# Patient Record
Sex: MALE | Race: WHITE | Employment: UNEMPLOYED | URBAN - METROPOLITAN AREA
[De-identification: names, ages, dates, MRNs, and addresses within clinical notes are randomized per-mention and may not be internally consistent; named-entity substitution may affect disease eponyms.]

---

## 2018-04-16 ENCOUNTER — APPOINTMENT (OUTPATIENT)
Dept: CT IMAGING | Facility: HOSPITAL | Age: 73
End: 2018-04-16
Attending: EMERGENCY MEDICINE
Payer: OTHER MISCELLANEOUS

## 2018-04-16 ENCOUNTER — HOSPITAL ENCOUNTER (EMERGENCY)
Facility: HOSPITAL | Age: 73
Discharge: HOME | End: 2018-04-16
Attending: EMERGENCY MEDICINE
Payer: OTHER MISCELLANEOUS

## 2018-04-16 ENCOUNTER — APPOINTMENT (OUTPATIENT)
Dept: GENERAL RADIOLOGY | Facility: HOSPITAL | Age: 73
End: 2018-04-16
Attending: EMERGENCY MEDICINE
Payer: OTHER MISCELLANEOUS

## 2018-04-16 ENCOUNTER — APPOINTMENT (OUTPATIENT)
Dept: OTHER | Facility: HOSPITAL | Age: 73
End: 2018-04-16
Attending: EMERGENCY MEDICINE

## 2018-04-16 VITALS
SYSTOLIC BLOOD PRESSURE: 131 MMHG | RESPIRATION RATE: 16 BRPM | WEIGHT: 214 LBS | OXYGEN SATURATION: 94 % | HEIGHT: 69 IN | HEART RATE: 62 BPM | TEMPERATURE: 98 F | DIASTOLIC BLOOD PRESSURE: 62 MMHG | BODY MASS INDEX: 31.7 KG/M2

## 2018-04-16 DIAGNOSIS — S06.0X0A CONCUSSION WITHOUT LOSS OF CONSCIOUSNESS, INITIAL ENCOUNTER: Primary | ICD-10-CM

## 2018-04-16 DIAGNOSIS — M25.511 ACUTE PAIN OF RIGHT SHOULDER: ICD-10-CM

## 2018-04-16 DIAGNOSIS — M54.50 BACK PAIN, LUMBOSACRAL: ICD-10-CM

## 2018-04-16 PROCEDURE — 72110 X-RAY EXAM L-2 SPINE 4/>VWS: CPT | Performed by: EMERGENCY MEDICINE

## 2018-04-16 PROCEDURE — 99284 EMERGENCY DEPT VISIT MOD MDM: CPT

## 2018-04-16 PROCEDURE — 99285 EMERGENCY DEPT VISIT HI MDM: CPT

## 2018-04-16 PROCEDURE — 73030 X-RAY EXAM OF SHOULDER: CPT | Performed by: EMERGENCY MEDICINE

## 2018-04-16 PROCEDURE — 70450 CT HEAD/BRAIN W/O DYE: CPT | Performed by: EMERGENCY MEDICINE

## 2018-04-16 PROCEDURE — 71101 X-RAY EXAM UNILAT RIBS/CHEST: CPT | Performed by: EMERGENCY MEDICINE

## 2018-04-16 RX ORDER — ACETAMINOPHEN 500 MG
1000 TABLET ORAL ONCE
Status: COMPLETED | OUTPATIENT
Start: 2018-04-16 | End: 2018-04-16

## 2018-04-16 NOTE — ED NOTES
Rufina Small Director of  called to check up on patient. No information was given to Rufina Small.  Rufina Small [phone]

## 2018-04-16 NOTE — ED INITIAL ASSESSMENT (HPI)
Pt aox4. Pt presents to ed per EMS from work. Pt slipped on ice. Pt hit rt posterior side of head, rt shoulder, rt ribs today at work. Pt doesn't remember the the fall.

## 2018-04-16 NOTE — ED NOTES
[facility] health RN called back & states Boris Nash 139 request breath alcohol/urine screen. [facility]

## 2018-04-16 NOTE — ED NOTES
Pt was given a sandwich and apple juice.  Spoke to Gio King. Pt to be transferred to [facility] after d/c

## 2018-04-16 NOTE — ED PROVIDER NOTES
Patient Seen in: [facility] Emergency Department    History   Patient presents with:  Trauma (cardiovascular, musculoskeletal)    Stated Complaint: Fall/     HPI    69-year-old white male who presents emergency room today for complaint of fall.   Natali Herrera rhythm without murmur gallop or rub. Patient has reproducible right-sided chest wall tenderness and right subscapular tenderness. No crepitance is noted. No thoracic spine pain is noted.     Abdomen is soft nondistended nontender to deep palpation there reveals:  FINDINGS:    LUNGS:  No significant pulmonary parenchymal abnormalities and normal vascularity. CARDIAC:  Normal size cardiac silhouette. MEDIASTINUM:  Normal.  PLEURA:  Normal.  BONES:  Normal for age.  No rib fracture or lesion.   SOFT TISSUES initial encounter  (primary encounter diagnosis)  Acute pain of right shoulder  Back pain, lumbosacral    Disposition:  Discharge  4/16/2018 12:35 pm    Follow-up:  IMELDA Haynes [address] [id number] [phone]    In 2 d

## 2018-04-17 ENCOUNTER — OFFICE VISIT (OUTPATIENT)
Dept: OTHER | Facility: HOSPITAL | Age: 73
End: 2018-04-17
Attending: PREVENTIVE MEDICINE

## 2018-04-17 ENCOUNTER — HOSPITAL ENCOUNTER (EMERGENCY)
Dept: CT IMAGING | Facility: HOSPITAL | Age: 73
Discharge: HOME | End: 2018-04-17
Attending: PREVENTIVE MEDICINE
Payer: OTHER MISCELLANEOUS

## 2018-04-17 DIAGNOSIS — S00.93XA HEAD CONTUSION: ICD-10-CM

## 2018-04-17 DIAGNOSIS — S00.93XA HEAD CONTUSION: Primary | ICD-10-CM

## 2018-04-17 PROCEDURE — 70450 CT HEAD/BRAIN W/O DYE: CPT | Performed by: PREVENTIVE MEDICINE

## 2018-04-17 NOTE — ED INITIAL ASSESSMENT (HPI)
Pt return to ed from home through work comp, pt c/o a fall 1 day ago, pt work up in the ed, return today with c/o increase head pain and unsteady gate. pt awake,alert and talking.

## 2018-04-17 NOTE — ED PROVIDER NOTES
Patient Seen in: [facility] Emergency Department    History   Patient presents with:  Headache (neurologic)    Stated Complaint: CHI    HPI    This is a 58-year-old male with past medical history of hypertension, hyperlipidemia and a baby aspirin da 97.9 °F (36.6 °C)  Temp src: n/a  SpO2: 99 %  O2 Device: n/a    Current:/79   Pulse 66   Temp 97.9 °F (36.6 °C)   Resp 18   Wt 97.1 kg   SpO2 93%   BMI 31.61 kg/m²         Physical Exam    GENERAL: Awake, alert oriented x3, nontoxic appearing.    SKIN 2233  ------------------------------------------------------------   Ct Brain Or Head (15361)    Result Date: 4/17/2018  PROCEDURE:  CT BRAIN OR HEAD (83322)  COMPARISON:  ARTIE CT BRAIN OR HEAD (16626), 4/16/2018, 10:56.   INDICATIONS:  Head trauma, hea the plan of care. He was discharged home in good condition with his wife.           Disposition and Plan     Clinical Impression:  Post concussion syndrome  (primary encounter diagnosis)  Muscle strain    Disposition:  Discharge  4/17/2018  5:53 pm    Foll

## 2018-04-20 ENCOUNTER — TELEPHONE (OUTPATIENT)
Dept: NEUROLOGY | Facility: CLINIC | Age: 73
End: 2018-04-20

## 2018-04-20 ENCOUNTER — APPOINTMENT (OUTPATIENT)
Dept: OTHER | Facility: HOSPITAL | Age: 73
End: 2018-04-20
Attending: PREVENTIVE MEDICINE

## 2018-04-26 ENCOUNTER — TELEPHONE (OUTPATIENT)
Dept: SURGERY | Facility: CLINIC | Age: 73
End: 2018-04-26

## 2018-04-26 NOTE — TELEPHONE ENCOUNTER
Advised that patient is on cancellation list for evaluation, and that for any neurological changes, or intolerable headaches, patient should present to ER for evaluation.

## 2018-05-01 ENCOUNTER — TELEPHONE (OUTPATIENT)
Dept: NEUROLOGY | Facility: CLINIC | Age: 73
End: 2018-05-01

## 2018-05-01 NOTE — PATIENT INSTRUCTIONS
Follow up in ~ 2 months   Have imaging done  Have labs done   Set up appointment with PT / OT  Start taking amitriptyline for prevention 10 mg nightly x1 week then increase to 20 mg nightly     Refill policies:    • Allow 2-3 business days for refills; con days. It is highly recommended patients contact their insurance carrier directly to determine coverage. If test is done without insurance authorization, patient may be responsible for the entire amount billed.       Precertification and Prior Authorization

## 2018-05-01 NOTE — H&P
[facility] Patient / Consult Visit    Chucky Vance is a 67year old male. Referring MD: No ref.  provider found    Patient presents with:  Head Injury: C/O of daily headaches,dizziness,gait issues,short t steps before losing balance. In terms of sleep, he has not been doing well - states he has poor sleep and has worsening pain on R side of head even when lying lightly on a pillow.               Otherwise, patient denies any recent weight change, fevers 69\".    Weight as of 4/17/18: 214 lb 1.1 oz.     GENERAL: well developed, well nourished, in no apparent distress  SKIN: no rashes  EYES: sclera anicteric, conjunctiva normal  HEENT: normocephalic  CARDIOVASCULAR: S1, S2 normal, RRR  LUNGS: clear to auscul pain  Tone: normal    Sensory:  Pin is normal  Vibration is reduced in left LE - absent L LE at great toes - present ~ 8 sec on L at malleolus; intact on R   Proprioception is normal  Romberg is unable to test - falls over standing with eyes open and feet head, and endorses pain in the right arm, with intermittent numbness in the same region, as well as his exam findings, I have recommended obtaining MRI of the cervical spine to evaluate for possible stenosis/myelopathy, contributing to his continued gait i [facility],        OP REFERRAL TO [facility],         CANCELED: MRI SPINE CERVICAL (CPT=72141)        As noted above     (G44.52) New persistent daily headache  Plan: as noted above     (R26.81) Gait instability  Plan: OP REFERRAL TO

## 2018-05-08 ENCOUNTER — HOSPITAL ENCOUNTER (OUTPATIENT)
Dept: GENERAL RADIOLOGY | Age: 73
Discharge: HOME | End: 2018-05-08
Attending: Other
Payer: OTHER MISCELLANEOUS

## 2018-05-08 ENCOUNTER — TELEPHONE (OUTPATIENT)
Dept: NEUROLOGY | Facility: CLINIC | Age: 73
End: 2018-05-08

## 2018-05-08 ENCOUNTER — APPOINTMENT (OUTPATIENT)
Dept: LAB | Age: 73
End: 2018-05-08
Attending: Other
Payer: OTHER MISCELLANEOUS

## 2018-05-08 DIAGNOSIS — R26.81 GAIT INSTABILITY: ICD-10-CM

## 2018-05-08 PROCEDURE — 36415 COLL VENOUS BLD VENIPUNCTURE: CPT

## 2018-05-08 PROCEDURE — 82607 VITAMIN B-12: CPT

## 2018-05-08 PROCEDURE — 72050 X-RAY EXAM NECK SPINE 4/5VWS: CPT | Performed by: OTHER

## 2018-05-08 NOTE — TELEPHONE ENCOUNTER
Called and spoke with  concerning imaging order. Asking if patient needed to have an extension/flexion views included. Informed  that exam has already ended. Would not be able to add views to existing order.     Thankful for t

## 2018-05-11 ENCOUNTER — TELEPHONE (OUTPATIENT)
Dept: NEUROLOGY | Facility: CLINIC | Age: 73
End: 2018-05-11

## 2018-05-11 NOTE — TELEPHONE ENCOUNTER
PT initial evaluation rec'd; patient to initiate PT at 3 x week for 2 weeks then possibly decrease to 2 x week for 2 weeks pending progression of symptoms. Form endorsed to Dr. Kyra Moreno for signature.

## 2018-05-15 ENCOUNTER — TELEPHONE (OUTPATIENT)
Dept: NEUROLOGY | Facility: CLINIC | Age: 73
End: 2018-05-15

## 2018-05-17 ENCOUNTER — TELEPHONE (OUTPATIENT)
Dept: NEUROLOGY | Facility: CLINIC | Age: 73
End: 2018-05-17

## 2018-05-18 ENCOUNTER — TELEPHONE (OUTPATIENT)
Dept: NEUROLOGY | Facility: CLINIC | Age: 73
End: 2018-05-18

## 2018-05-18 NOTE — TELEPHONE ENCOUNTER
----- Message from Berkley Horowitz MD sent at 5/18/2018  9:31 AM CDT -----  Results noted, all labs normal; will discuss at next visit

## 2018-05-23 ENCOUNTER — TELEPHONE (OUTPATIENT)
Dept: NEUROLOGY | Facility: CLINIC | Age: 73
End: 2018-05-23

## 2018-05-23 NOTE — TELEPHONE ENCOUNTER
Nisa Petty PT calling to report that he feels patient could benefit from a neuro-ophth consult and possible prisms for vision support. Eyes were noted to be dysfucntional and headaches elicited with simple tracking movements.  He has difficulty tracking and r

## 2018-06-04 NOTE — PROGRESS NOTES
[facility] Progress Note    HPI  Patient presents with:  Neurologic Problem:  Follow up and not doing good and having headaches mostly on the right side       As per my initial H&P from 5/1/18:  \" Amisha Cain is a 67year old, who has poor sleep and has worsening pain on R side of head even when lying lightly on a pillow.               Otherwise, patient denies any recent weight change, fevers, chills, nausea, double vision/ blurry vision / loss of vision, chest pain, palpitations, s nightly., Disp: , Rfl:   •  Amitriptyline HCl 10 MG Oral Tab, Take 3 tablets (30 mg total) by mouth nightly., Disp: 90 tablet, Rfl: 1  •  AmLODIPine Besylate 5 MG Oral Tab, Take 5 mg by mouth daily. , Disp: , Rfl:   •  atorvastatin 20 MG Oral Tab, Take 20 m reflect osteopenia or osteoporosis. There is mild apex left cervical curvature. There is straightening of the expected lordosis. There is a few mm anterolisthesis of C2 on C3 and C4 on C5, which may be secondary to   degenerative laxity.   Vertebral body off work at this time, pending repeat evaluation in approximately 1 month     For his continued headaches, will increase amitriptyline up to 30 mg nightly.      (F07.81) Post concussive syndrome  (primary encounter diagnosis)  Plan: OPTOMETRY([facility])-EXTERNAL,

## 2018-06-04 NOTE — PATIENT INSTRUCTIONS
See vestibular therapy  Set up appointment with optemetry for evaluation of eye movement issues  Increase amitriptyline to 30 mg nightly - take Tylenol or Ibuprofen as needed   Follow up in 6 weeks   Refill policies:    • Allow 2-3 business days for refill 3-10 days. It is highly recommended patients contact their insurance carrier directly to determine coverage. If test is done without insurance authorization, patient may be responsible for the entire amount billed.       Precertification and Prior [employer]

## 2018-06-07 ENCOUNTER — TELEPHONE (OUTPATIENT)
Dept: NEUROLOGY | Facility: CLINIC | Age: 73
End: 2018-06-07

## 2018-06-07 NOTE — TELEPHONE ENCOUNTER
Josh Lundberg, rep from [employer], Kaylen Garcia w/c provider, is requesting pt's last OV notes.   Fax to [phone]

## 2018-06-28 ENCOUNTER — TELEPHONE (OUTPATIENT)
Dept: NEUROLOGY | Facility: CLINIC | Age: 73
End: 2018-06-28

## 2018-06-28 NOTE — TELEPHONE ENCOUNTER
Received paperwork for patient, who has upcoming appointment 7/2/18. However China Del Rio would like to relay some concerns he has for treatment plan, requesting call by Dr. Prentis Siemens.

## 2018-06-29 NOTE — TELEPHONE ENCOUNTER
Called and discussed case with Geoffrey Hall from PT - patient not having benefit from PT and vestibular therapy and plan to do vision therapy with Dr. Chapa Husbands (optometry) first for ~ 1 month and then consider resuming PT/vestibular therapy    Thought be complicati headaches, suppression as the brain is forced to \"turn off\" or ignore the visual input from one eye to keep from seeing double, and eventual double vision is visual system breakdown. Traumatic brain injuries can certainly exacerbate these issues.     I f

## 2018-07-02 NOTE — PATIENT INSTRUCTIONS
Refill policies:    • Allow 2-3 business days for refills; controlled substances may take longer.   • Contact your pharmacy at least 5 days prior to running out of medication and have them send an electronic request or submit request through the “request re entire amount billed. Precertification and Prior Authorizations: If your physician has recommended that you have a procedure or additional testing performed.   [facility]) will contact your insurance carrier to obtain pre-certi

## 2018-07-02 NOTE — PROGRESS NOTES
[facility] Progress Note    HPI  Patient presents with:  Head Injury: Follow up and havingv balance issues,headaches,dizzness      As per my initial H&P from 5/1/18:  \" Elvera Gosselin is a 67year old, who presents for evaluation of worsening pain on R side of head even when lying lightly on a pillow.               Otherwise, patient denies any recent weight change, fevers, chills, nausea, double vision/ blurry vision / loss of vision, chest pain, palpitations, shortness of breath, soren Pravastatin Sodium 40 MG Oral Tab, Take 40 mg by mouth nightly., Disp: , Rfl:   •  Amitriptyline HCl 10 MG Oral Tab, Take 3 tablets (30 mg total) by mouth nightly., Disp: 90 tablet, Rfl: 1  •  AmLODIPine Besylate 5 MG Oral Tab, Take 5 mg by mouth daily. , D unable to assess; has extreme vertigo on standing with vergence movement of left eye when standing and requires assistance to stand without falling    Labs:  None new since last visit    Prior as noted below:     B12: 361 (normal)      Imaging:  None new instability. In terms of treatment, will continue him on the same dose of amitriptyline at 30 mg nightly for headaches, as this seems to have improved his headaches.   Patient was additionally advised to continue with the plan of care as previously discu and coordination of care, including discussion of diagnostic workup to date, plan of care for therapy and additional workup as noted above; patient allowed to ask questions and all questions answered to the best of my ability     Return in about 2 months (

## 2018-07-03 ENCOUNTER — TELEPHONE (OUTPATIENT)
Dept: NEUROLOGY | Facility: CLINIC | Age: 73
End: 2018-07-03

## 2018-07-30 ENCOUNTER — ANESTHESIA EVENT (OUTPATIENT)
Dept: MRI IMAGING | Facility: HOSPITAL | Age: 73
End: 2018-07-30

## 2018-07-31 ENCOUNTER — APPOINTMENT (OUTPATIENT)
Dept: LAB | Facility: HOSPITAL | Age: 73
End: 2018-07-31
Payer: OTHER MISCELLANEOUS

## 2018-07-31 ENCOUNTER — ANESTHESIA (OUTPATIENT)
Dept: MRI IMAGING | Facility: HOSPITAL | Age: 73
End: 2018-07-31

## 2018-07-31 ENCOUNTER — HOSPITAL ENCOUNTER (OUTPATIENT)
Dept: MRI IMAGING | Facility: HOSPITAL | Age: 73
Discharge: HOME | End: 2018-07-31
Attending: ORTHOPAEDIC SURGERY
Payer: OTHER MISCELLANEOUS

## 2018-07-31 ENCOUNTER — HOSPITAL ENCOUNTER (OUTPATIENT)
Dept: MRI IMAGING | Facility: HOSPITAL | Age: 73
Discharge: HOME | End: 2018-07-31
Attending: Other
Payer: OTHER MISCELLANEOUS

## 2018-07-31 ENCOUNTER — APPOINTMENT (OUTPATIENT)
Dept: LAB | Facility: HOSPITAL | Age: 73
End: 2018-07-31
Attending: Other
Payer: OTHER MISCELLANEOUS

## 2018-07-31 VITALS
DIASTOLIC BLOOD PRESSURE: 69 MMHG | OXYGEN SATURATION: 96 % | RESPIRATION RATE: 12 BRPM | TEMPERATURE: 98 F | HEART RATE: 82 BPM | SYSTOLIC BLOOD PRESSURE: 113 MMHG

## 2018-07-31 VITALS
RESPIRATION RATE: 15 BRPM | OXYGEN SATURATION: 95 % | DIASTOLIC BLOOD PRESSURE: 65 MMHG | TEMPERATURE: 98 F | HEART RATE: 76 BPM | SYSTOLIC BLOOD PRESSURE: 126 MMHG

## 2018-07-31 DIAGNOSIS — R29.6 FREQUENT FALLS: ICD-10-CM

## 2018-07-31 DIAGNOSIS — F07.81 POST CONCUSSIVE SYNDROME: ICD-10-CM

## 2018-07-31 DIAGNOSIS — G44.52 NEW PERSISTENT DAILY HEADACHE: ICD-10-CM

## 2018-07-31 DIAGNOSIS — H81.4 VERTIGO OF CENTRAL ORIGIN, UNSPECIFIED LATERALITY: ICD-10-CM

## 2018-07-31 DIAGNOSIS — F07.81 POST CONCUSSION SYNDROME: ICD-10-CM

## 2018-07-31 DIAGNOSIS — R20.0 RIGHT ARM NUMBNESS: ICD-10-CM

## 2018-07-31 DIAGNOSIS — M25.511 RIGHT SHOULDER PAIN, UNSPECIFIED CHRONICITY: ICD-10-CM

## 2018-07-31 DIAGNOSIS — S46.091A OTHER INJURY OF MUSCLE(S) AND TENDON(S) OF THE ROTATOR CUFF OF RIGHT SHOULDER, INITIAL ENCOUNTER: ICD-10-CM

## 2018-07-31 LAB
ATRIAL RATE: 83 BPM
P AXIS: 10 DEGREES
P-R INTERVAL: 228 MS
Q-T INTERVAL: 392 MS
QRS DURATION: 98 MS
QTC CALCULATION (BEZET): 460 MS
R AXIS: -41 DEGREES
T AXIS: 29 DEGREES
VENTRICULAR RATE: 83 BPM

## 2018-07-31 PROCEDURE — 93010 ELECTROCARDIOGRAM REPORT: CPT | Performed by: INTERNAL MEDICINE

## 2018-07-31 PROCEDURE — 72156 MRI NECK SPINE W/O & W/DYE: CPT | Performed by: OTHER

## 2018-07-31 PROCEDURE — 93005 ELECTROCARDIOGRAM TRACING: CPT

## 2018-07-31 PROCEDURE — 73221 MRI JOINT UPR EXTREM W/O DYE: CPT | Performed by: ORTHOPAEDIC SURGERY

## 2018-07-31 NOTE — ANESTHESIA PREPROCEDURE EVALUATION
PRE-OP EVALUATION    Patient Name: Jose Pettit    Pre-op Diagnosis: Ataxia, Post-Concussive Syndrome    MRI Brain, Cervical Spine, Right Shoulder    * No surgeons found in log *    Pre-op vitals reviewed.   Pulse: 81  Resp: 16  BP: 146/84  SpO2: 96 % regular  Rate: normal     Dental    No notable dental history. Pulmonary    Pulmonary exam normal.                 Other findings            ASA: 2   Plan: general  NPO status verified and patient meets guidelines.   Patient has taken beta blockers

## 2018-07-31 NOTE — IMAGING NOTE
Pt in [facility] 994 1. Pt states he has been NPO x8 hours. PT took amlodipine and ASA this am with small sip of water. Pre-procedure VSS. IV placed to LW saline locked and flushes well. This RN spoke with MRI tech informing them of pt location and pt ready for MRI.

## 2018-07-31 NOTE — ANESTHESIA POSTPROCEDURE EVALUATION
Aimee 36 Patient Status:  Outpatient   Age/Gender 75year old male MRN [id number]   Location [address] [place] [address] MRI Attending Aneesh Jaime MD   Hosp Day # 0 PCP None Pcp       Anesthesia Post-op Note    * No procedures listed *

## 2018-08-02 ENCOUNTER — TELEPHONE (OUTPATIENT)
Dept: NEUROLOGY | Facility: CLINIC | Age: 73
End: 2018-08-02

## 2018-08-02 NOTE — TELEPHONE ENCOUNTER
called earlier for copies of pt's MRIs - brain and cervical.  She received the cervical, but not the brain. Requesting it be faxed to her at [phone].

## 2018-08-07 ENCOUNTER — TELEPHONE (OUTPATIENT)
Dept: NEUROLOGY | Facility: CLINIC | Age: 73
End: 2018-08-07

## 2018-08-07 NOTE — PATIENT INSTRUCTIONS
Refill policies:    • Allow 2-3 business days for refills; controlled substances may take longer.   • Contact your pharmacy at least 5 days prior to running out of medication and have them send an electronic request or submit request through the “request re entire amount billed. Precertification and Prior Authorizations: If your physician has recommended that you have a procedure or additional testing performed.   [facility]) will contact your insurance carrier to obtain pre-certi

## 2018-08-07 NOTE — PROGRESS NOTES
[facility] Progress Note    HPI  Patient presents with:  Post-Concussion Syndrome: Follow up after MRI of cervical and brain results      As per my initial H&P from 5/1/18:  \" Zunildacarolyn Irlanda is a 67year old, who presents for evalua has worsening pain on R side of head even when lying lightly on a pillow.               Otherwise, patient denies any recent weight change, fevers, chills, nausea, double vision/ blurry vision / loss of vision, chest pain, palpitations, shortness of breath, HIVES      Current Outpatient Prescriptions:   •  atorvastatin 20 MG Oral Tab, Take 20 mg by mouth daily. , Disp: , Rfl:   •  Amitriptyline HCl 10 MG Oral Tab, Take 3 tablets (30 mg total) by mouth nightly., Disp: 90 tablet, Rfl: 2  •  methylPREDNISolone to light touch throughout  Coord: FNF intact with no tremor or dysmetria; rapid alternating movements intact bilaterally but slow  Romberg: unable to assess  Gait: unable to assess; has extreme vertigo on standing with vergence movement of left eye when st (7/31/18): FINDINGS:    CERVICAL DISC LEVELS:  C2-C3:  There is no significant disc bulge or herniation. There is moderate left facet joint degenerative change. There is mild to moderate left neural foraminal narrowing.   C3-C4:  There is moderate deg Multilevel degenerative disc disease of the cervical spine at C3-4 to C3-6 without significant central canal stenosis. Prior as noted below     Xray cervical spine (5/8/18): FINDINGS:    Demineralization may reflect osteopenia or osteoporosis.   Radha Lara his postconcussive syndrome.     From a neurologic perspective, there is no contraindication to continuing with physical therapy/vestibular therapy/vision therapy,      Patient was advised to continue with the plan of care as previously discussed, with chad diagnostic workup to date, plan of care for therapy and rationale for continued monitoring;  patient allowed to ask questions and all questions answered to the best of my ability     Return in about 6 weeks (around 9/18/2018).     Meli Byrne MD, Neurol

## 2018-08-07 NOTE — TELEPHONE ENCOUNTER
Patient states that 2 medications were to be sent to pharmacy. Per Epic record only amitriptyline was sent; will have to clarify with Dr. Chava Ferreira.

## 2018-09-20 ENCOUNTER — HOSPITAL ENCOUNTER (OUTPATIENT)
Dept: CT IMAGING | Facility: HOSPITAL | Age: 73
Discharge: HOME | End: 2018-09-20
Attending: Other
Payer: OTHER MISCELLANEOUS

## 2018-09-20 DIAGNOSIS — H83.2X3 VESTIBULAR DYSFUNCTION OF BOTH EARS: ICD-10-CM

## 2018-09-20 DIAGNOSIS — R41.89 COGNITIVE IMPAIRMENT: ICD-10-CM

## 2018-09-20 DIAGNOSIS — F07.81 POSTCONCUSSION SYNDROME: ICD-10-CM

## 2018-09-20 DIAGNOSIS — M54.81 OCCIPITAL NEURALGIA OF RIGHT SIDE: ICD-10-CM

## 2018-09-20 DIAGNOSIS — I77.74 DISSECTION OF VERTEBRAL ARTERY (HCC): ICD-10-CM

## 2018-09-20 DIAGNOSIS — H46.9 OPTIC NEURITIS: ICD-10-CM

## 2018-09-20 PROCEDURE — 82565 ASSAY OF CREATININE: CPT

## 2018-09-20 PROCEDURE — 70496 CT ANGIOGRAPHY HEAD: CPT | Performed by: OTHER

## 2018-09-20 PROCEDURE — 70498 CT ANGIOGRAPHY NECK: CPT | Performed by: OTHER

## 2018-09-25 LAB — CREAT SERPL-MCNC: 1 MG/DL (ref 0.7–1.3)

## 2019-10-31 ENCOUNTER — HOSPITAL ENCOUNTER (EMERGENCY)
Facility: HOSPITAL | Age: 74
Discharge: HOME | End: 2019-10-31
Attending: EMERGENCY MEDICINE
Payer: COMMERCIAL

## 2019-10-31 ENCOUNTER — APPOINTMENT (OUTPATIENT)
Dept: CT IMAGING | Facility: HOSPITAL | Age: 74
End: 2019-10-31
Attending: EMERGENCY MEDICINE
Payer: COMMERCIAL

## 2019-10-31 VITALS
HEIGHT: 69 IN | BODY MASS INDEX: 30.21 KG/M2 | HEART RATE: 74 BPM | DIASTOLIC BLOOD PRESSURE: 77 MMHG | SYSTOLIC BLOOD PRESSURE: 133 MMHG | WEIGHT: 204 LBS | RESPIRATION RATE: 18 BRPM | OXYGEN SATURATION: 97 % | TEMPERATURE: 97 F
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
## 2019-10-31 DIAGNOSIS — R42 DIZZINESS: ICD-10-CM

## 2019-10-31 PROCEDURE — 99284 EMERGENCY DEPT VISIT MOD MDM: CPT

## 2019-10-31 PROCEDURE — 70450 CT HEAD/BRAIN W/O DYE: CPT | Performed by: EMERGENCY MEDICINE

## 2019-10-31 PROCEDURE — 93005 ELECTROCARDIOGRAM TRACING: CPT

## 2019-10-31 PROCEDURE — 93010 ELECTROCARDIOGRAM REPORT: CPT
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
## 2024-06-22 ENCOUNTER — HOSPITAL ENCOUNTER (EMERGENCY)
Facility: HOSPITAL | Age: 79
Discharge: HOME | End: 2024-06-22
Attending: EMERGENCY MEDICINE

## 2024-06-22 VITALS
RESPIRATION RATE: 20 BRPM | TEMPERATURE: 98 F | WEIGHT: 202 LBS | SYSTOLIC BLOOD PRESSURE: 159 MMHG | DIASTOLIC BLOOD PRESSURE: 71 MMHG | BODY MASS INDEX: 29.92 KG/M2 | OXYGEN SATURATION: 96 % | HEART RATE: 77 BPM | HEIGHT: 69 IN

## 2024-06-22 DIAGNOSIS — R25.1 SHAKING: Primary | ICD-10-CM

## 2024-06-22 LAB
ALBUMIN SERPL-MCNC: 3.9 G/DL (ref 3.4–5)
ALBUMIN/GLOB SERPL: 1.1 {RATIO} (ref 1–2)
ALP LIVER SERPL-CCNC: 99 U/L
ALT SERPL-CCNC: 15 U/L
ANION GAP SERPL CALC-SCNC: 7 MMOL/L (ref 0–18)
AST SERPL-CCNC: 38 U/L (ref 15–37)
BASOPHILS # BLD AUTO: 0.04 X10(3) UL (ref 0–0.2)
BASOPHILS NFR BLD AUTO: 0.6 %
BILIRUB SERPL-MCNC: 0.7 MG/DL (ref 0.1–2)
BILIRUB UR QL STRIP.AUTO: NEGATIVE
BUN BLD-MCNC: 24 MG/DL (ref 9–23)
CALCIUM BLD-MCNC: 9.2 MG/DL (ref 8.5–10.1)
CHLORIDE SERPL-SCNC: 109 MMOL/L (ref 98–112)
CLARITY UR REFRACT.AUTO: CLEAR
CO2 SERPL-SCNC: 22 MMOL/L (ref 21–32)
CREAT BLD-MCNC: 1.3 MG/DL
EGFRCR SERPLBLD CKD-EPI 2021: 56 ML/MIN/1.73M2 (ref 60–?)
EOSINOPHIL # BLD AUTO: 0.1 X10(3) UL (ref 0–0.7)
EOSINOPHIL NFR BLD AUTO: 1.4 %
ERYTHROCYTE [DISTWIDTH] IN BLOOD BY AUTOMATED COUNT: 15.2 %
GLOBULIN PLAS-MCNC: 3.5 G/DL (ref 2.8–4.4)
GLUCOSE BLD-MCNC: 108 MG/DL (ref 70–99)
GLUCOSE UR STRIP.AUTO-MCNC: NORMAL MG/DL
HCT VFR BLD AUTO: 39 %
HGB BLD-MCNC: 12.6 G/DL
IMM GRANULOCYTES # BLD AUTO: 0.04 X10(3) UL (ref 0–1)
IMM GRANULOCYTES NFR BLD: 0.6 %
LEUKOCYTE ESTERASE UR QL STRIP.AUTO: NEGATIVE
LYMPHOCYTES # BLD AUTO: 1.51 X10(3) UL (ref 1–4)
LYMPHOCYTES NFR BLD AUTO: 21 %
MCH RBC QN AUTO: 28.3 PG (ref 26–34)
MCHC RBC AUTO-ENTMCNC: 32.3 G/DL (ref 31–37)
MCV RBC AUTO: 87.6 FL
MONOCYTES # BLD AUTO: 1.12 X10(3) UL (ref 0.1–1)
MONOCYTES NFR BLD AUTO: 15.6 %
NEUTROPHILS # BLD AUTO: 4.38 X10 (3) UL (ref 1.5–7.7)
NEUTROPHILS # BLD AUTO: 4.38 X10(3) UL (ref 1.5–7.7)
NEUTROPHILS NFR BLD AUTO: 60.8 %
NITRITE UR QL STRIP.AUTO: NEGATIVE
OSMOLALITY SERPL CALC.SUM OF ELEC: 291 MOSM/KG (ref 275–295)
PH UR STRIP.AUTO: 5 [PH] (ref 5–8)
PLATELET # BLD AUTO: 208 10(3)UL (ref 150–450)
PLATELETS.RETICULATED NFR BLD AUTO: 4.1 % (ref 0–7)
POTASSIUM SERPL-SCNC: 4 MMOL/L (ref 3.5–5.1)
PROT SERPL-MCNC: 7.4 G/DL (ref 6.4–8.2)
PROT UR STRIP.AUTO-MCNC: NEGATIVE MG/DL
RBC # BLD AUTO: 4.45 X10(6)UL
RBC UR QL AUTO: NEGATIVE
SODIUM SERPL-SCNC: 138 MMOL/L (ref 136–145)
SP GR UR STRIP.AUTO: 1.02 (ref 1–1.03)
UROBILINOGEN UR STRIP.AUTO-MCNC: NORMAL MG/DL
WBC # BLD AUTO: 7.2 X10(3) UL (ref 4–11)

## 2024-06-22 PROCEDURE — 99283 EMERGENCY DEPT VISIT LOW MDM: CPT

## 2024-06-22 PROCEDURE — 81003 URINALYSIS AUTO W/O SCOPE: CPT | Performed by: EMERGENCY MEDICINE

## 2024-06-22 PROCEDURE — 36415 COLL VENOUS BLD VENIPUNCTURE: CPT

## 2024-06-22 PROCEDURE — 85025 COMPLETE CBC W/AUTO DIFF WBC: CPT | Performed by: EMERGENCY MEDICINE

## 2024-06-22 PROCEDURE — 80053 COMPREHEN METABOLIC PANEL: CPT | Performed by: EMERGENCY MEDICINE

## 2024-06-22 RX ORDER — OXYCODONE AND ACETAMINOPHEN 10; 325 MG/1; MG/1
1 TABLET ORAL NIGHTLY PRN
COMMUNITY

## 2024-06-22 RX ORDER — LISINOPRIL 10 MG/1
10 TABLET ORAL DAILY
COMMUNITY

## 2024-06-23 NOTE — ED INITIAL ASSESSMENT (HPI)
Pt to ED for c/o \"shaking a lot\" x 1 wk. No tremors or shaking noted when Pt was in Triage. Pt ambulatory with steady gait noted without any assistive device. [facility] since he \"can't sleep for 2 months\" intermittently. Pt was prescribed Doxylamine, which Pt states he didn't start taking yet. Pt report taking 1/2 tablet of his Oxycodone 10/325 at 4 PM PTA for his Chronic Back Pain.

## 2024-06-23 NOTE — ED QUICK NOTES
Pt refusing CT scan. Pt states he just had one 4 days ago and it was normal. Pt tells this RN he has been seen 5 times in the past 2 weeks at different emergency rooms for same sx. Dr. Tamayo aware

## (undated) NOTE — LETTER
18          Krysta Cordero  :  7/3/1945      To Whom It May Concern: This patient was seen in our office on 18 .   Work status:  Remain off work until re-evaluation at scheduled appointment on 18    If this office may be of further

## (undated) NOTE — ED AVS SNAPSHOT
Gabo Waddell   MRN: [id number]    Department:  [facility] Emergency Department   Date of Visit:  10/31/2019           Disclosure     Insurance plans vary and the physician(s) referred by the ER may not be covered by your plan.  Please contact tell this physician (or your personal doctor if your instructions are to return to your personal doctor) about any new or lasting problems. The primary care or specialist physician will see patients referred from the [facility] Emergency Department.  Don Hill

## (undated) NOTE — LETTER
Date & Time: 4/16/2018, 1:52 PM  Patient: Severiano Juba  Encounter Provider(s):    Yamileth Anderson MD       To Whom It May Concern:    Tre Styles was seen and treated in our department on 4/16/2018. He may return to work 4/18/2018.     If you h

## (undated) NOTE — LETTER
Date: 8/7/2018    Patient Name: Aubrey Schilling          To Whom It May Concern:     This patient was seen in our office on 08/07/18.  Work status: Marea Murders off work until re-evaluation at scheduled appointment on 9/11/18.      If this office may be of fu

## (undated) NOTE — LETTER
Date & Time: 4/17/2018, 5:55 PM  Patient: Antonio Bailey  Encounter Provider(s):    Cristina Chilel DO       To Whom It May Concern:    Maribell Leahy was seen and treated in our department on 4/17/2018.  He should not return to work until cleared by

## (undated) NOTE — ED AVS SNAPSHOT
Maine Amaral   MRN: [id number]    Department:  [facility] Emergency Department   Date of Visit:  4/16/2018           Disclosure     Insurance plans vary and the physician(s) referred by the ER may not be covered by your plan.  Please contact y tell this physician (or your personal doctor if your instructions are to return to your personal doctor) about any new or lasting problems. The primary care or specialist physician will see patients referred from the [facility] Emergency Department.  Prachi Ku

## (undated) NOTE — LETTER
18          Layla Macario  :  7/3/1945      To Whom It May Concern: This patient was seen in our office on 18 . He should remain off work until his next re-evaluation which is scheduled for 2018.       If this office may be of fur

## (undated) NOTE — LETTER
Date: 7/2/2018    Patient Name: Ortiz Bravo            To Whom It May Concern:     This patient was seen in our office on 07/02/18 .   Work status:  Remain off work until re-evaluation at scheduled appointment on 8/7/18.      If this office may be of

## (undated) NOTE — ED AVS SNAPSHOT
Mila Sargent   MRN: [id number]    Department:  [facility] Emergency Department   Date of Visit:  4/17/2018           Disclosure     Insurance plans vary and the physician(s) referred by the ER may not be covered by your plan.  Please contact y tell this physician (or your personal doctor if your instructions are to return to your personal doctor) about any new or lasting problems. The primary care or specialist physician will see patients referred from the [facility] Emergency Department.  Joselin Wren